# Patient Record
Sex: FEMALE | Race: BLACK OR AFRICAN AMERICAN | NOT HISPANIC OR LATINO | URBAN - METROPOLITAN AREA
[De-identification: names, ages, dates, MRNs, and addresses within clinical notes are randomized per-mention and may not be internally consistent; named-entity substitution may affect disease eponyms.]

---

## 2023-02-09 PROBLEM — M79.642 PAIN OF LEFT HAND: Status: ACTIVE | Noted: 2023-02-09

## 2023-02-09 PROBLEM — G51.0 LEFT-SIDED BELL'S PALSY: Status: ACTIVE | Noted: 2023-02-09

## 2023-02-09 PROBLEM — E05.90 SUBCLINICAL HYPERTHYROIDISM: Status: ACTIVE | Noted: 2023-02-09

## 2023-02-09 PROBLEM — Z99.3 USES POWERED WHEELCHAIR: Status: ACTIVE | Noted: 2023-02-09

## 2023-02-09 PROBLEM — R06.83 LOUD SNORING: Status: ACTIVE | Noted: 2023-02-09

## 2023-02-09 PROBLEM — R53.1 GENERAL WEAKNESS: Status: ACTIVE | Noted: 2023-02-09

## 2023-02-09 PROBLEM — G89.29 CHRONIC LOW BACK PAIN: Status: ACTIVE | Noted: 2023-02-09

## 2023-02-09 PROBLEM — R63.5 WEIGHT GAIN: Status: ACTIVE | Noted: 2023-02-09

## 2023-02-09 PROBLEM — E34.30 SHORT STATURE DUE TO ENDOCRINE DISORDER: Status: ACTIVE | Noted: 2023-02-09

## 2023-02-09 PROBLEM — R01.1 HEART MURMUR, SYSTOLIC: Status: ACTIVE | Noted: 2023-02-09

## 2023-02-09 PROBLEM — M48.061 LUMBAR SPINAL STENOSIS: Status: ACTIVE | Noted: 2023-02-09

## 2023-02-09 PROBLEM — I10 BENIGN ESSENTIAL HYPERTENSION: Status: ACTIVE | Noted: 2023-02-09

## 2023-02-09 PROBLEM — J01.90 ACUTE SINUSITIS: Status: ACTIVE | Noted: 2023-02-09

## 2023-02-09 PROBLEM — R05.9 COUGH: Status: ACTIVE | Noted: 2023-02-09

## 2023-02-09 PROBLEM — G47.19 EXCESSIVE DAYTIME SLEEPINESS: Status: ACTIVE | Noted: 2023-02-09

## 2023-02-09 PROBLEM — R09.82 POST-NASAL DRIP: Status: ACTIVE | Noted: 2023-02-09

## 2023-02-09 PROBLEM — E78.5 HYPERLIPIDEMIA: Status: ACTIVE | Noted: 2023-02-09

## 2023-02-09 PROBLEM — M25.473 ANKLE SWELLING: Status: ACTIVE | Noted: 2023-02-09

## 2023-02-09 PROBLEM — M25.532 ARTHRALGIA OF LEFT WRIST: Status: ACTIVE | Noted: 2023-02-09

## 2023-02-09 PROBLEM — M54.50 CHRONIC LOW BACK PAIN: Status: ACTIVE | Noted: 2023-02-09

## 2023-02-09 PROBLEM — R42 VERTIGO: Status: ACTIVE | Noted: 2023-02-09

## 2023-02-09 PROBLEM — E34.328 DWARFISM: Status: ACTIVE | Noted: 2023-02-09

## 2023-02-09 PROBLEM — L30.4 INTERTRIGO: Status: ACTIVE | Noted: 2023-02-09

## 2023-02-09 PROBLEM — D22.9 MULTIPLE NEVI: Status: ACTIVE | Noted: 2023-02-09

## 2023-02-09 RX ORDER — TRIAMTERENE/HYDROCHLOROTHIAZID 37.5-25 MG
1 TABLET ORAL DAILY
COMMUNITY
Start: 2018-03-30 | End: 2023-06-06 | Stop reason: SDUPTHER

## 2023-02-09 RX ORDER — LOSARTAN POTASSIUM 50 MG/1
1 TABLET ORAL DAILY
COMMUNITY
Start: 2018-03-30 | End: 2023-06-06 | Stop reason: SDUPTHER

## 2023-02-09 RX ORDER — ROSUVASTATIN CALCIUM 5 MG/1
1 TABLET, COATED ORAL NIGHTLY
COMMUNITY
Start: 2021-03-25 | End: 2023-06-06 | Stop reason: SDUPTHER

## 2023-03-28 ENCOUNTER — TELEPHONE (OUTPATIENT)
Dept: PRIMARY CARE | Facility: CLINIC | Age: 68
End: 2023-03-28
Payer: COMMERCIAL

## 2023-03-28 DIAGNOSIS — G47.33 OBSTRUCTIVE SLEEP APNEA: Primary | ICD-10-CM

## 2023-04-03 NOTE — TELEPHONE ENCOUNTER
Sleep apnea testing results faxed to the office.  Demonstrates moderate obstructive sleep apnea exacerbated to severe in rem off supine sleep.  They are recommending an in lab CPAP titration study.  In addition to other factors such as continued weight loss as well as consistent sleep and wake times.

## 2023-06-06 ENCOUNTER — OFFICE VISIT (OUTPATIENT)
Dept: PRIMARY CARE | Facility: CLINIC | Age: 68
End: 2023-06-06
Payer: MEDICARE

## 2023-06-06 ENCOUNTER — TELEPHONE (OUTPATIENT)
Dept: PRIMARY CARE | Facility: CLINIC | Age: 68
End: 2023-06-06

## 2023-06-06 VITALS — TEMPERATURE: 97.5 F | DIASTOLIC BLOOD PRESSURE: 88 MMHG | SYSTOLIC BLOOD PRESSURE: 136 MMHG | HEART RATE: 61 BPM

## 2023-06-06 DIAGNOSIS — E78.2 MIXED HYPERLIPIDEMIA: ICD-10-CM

## 2023-06-06 DIAGNOSIS — Z78.0 ASYMPTOMATIC MENOPAUSAL STATE: ICD-10-CM

## 2023-06-06 DIAGNOSIS — Z00.00 HEALTHCARE MAINTENANCE: ICD-10-CM

## 2023-06-06 DIAGNOSIS — M25.473 ANKLE SWELLING, UNSPECIFIED LATERALITY: ICD-10-CM

## 2023-06-06 DIAGNOSIS — M54.41 CHRONIC BILATERAL LOW BACK PAIN WITH BILATERAL SCIATICA: ICD-10-CM

## 2023-06-06 DIAGNOSIS — Z12.31 ENCOUNTER FOR SCREENING MAMMOGRAM FOR BREAST CANCER: ICD-10-CM

## 2023-06-06 DIAGNOSIS — Z71.89 CARDIAC RISK COUNSELING: ICD-10-CM

## 2023-06-06 DIAGNOSIS — Z12.12 SCREENING FOR COLORECTAL CANCER: ICD-10-CM

## 2023-06-06 DIAGNOSIS — Z99.3 USES POWERED WHEELCHAIR: ICD-10-CM

## 2023-06-06 DIAGNOSIS — E34.30 SHORT STATURE DUE TO ENDOCRINE DISORDER: ICD-10-CM

## 2023-06-06 DIAGNOSIS — E66.01 OBESITY, MORBID (MULTI): ICD-10-CM

## 2023-06-06 DIAGNOSIS — M48.061 SPINAL STENOSIS OF LUMBAR REGION, UNSPECIFIED WHETHER NEUROGENIC CLAUDICATION PRESENT: ICD-10-CM

## 2023-06-06 DIAGNOSIS — Z00.00 ROUTINE GENERAL MEDICAL EXAMINATION AT HEALTH CARE FACILITY: ICD-10-CM

## 2023-06-06 DIAGNOSIS — I10 BENIGN ESSENTIAL HYPERTENSION: ICD-10-CM

## 2023-06-06 DIAGNOSIS — G89.29 CHRONIC BILATERAL LOW BACK PAIN WITH BILATERAL SCIATICA: ICD-10-CM

## 2023-06-06 DIAGNOSIS — E05.90 SUBCLINICAL HYPERTHYROIDISM: ICD-10-CM

## 2023-06-06 DIAGNOSIS — Z12.11 SCREENING FOR COLORECTAL CANCER: ICD-10-CM

## 2023-06-06 DIAGNOSIS — Z71.89 ADVANCE DIRECTIVE DISCUSSED WITH PATIENT: ICD-10-CM

## 2023-06-06 DIAGNOSIS — Z00.00 MEDICARE ANNUAL WELLNESS VISIT, SUBSEQUENT: Primary | ICD-10-CM

## 2023-06-06 DIAGNOSIS — M54.42 CHRONIC BILATERAL LOW BACK PAIN WITH BILATERAL SCIATICA: ICD-10-CM

## 2023-06-06 DIAGNOSIS — Z13.89 ENCOUNTER FOR SCREENING FOR OTHER DISORDER: ICD-10-CM

## 2023-06-06 PROCEDURE — 1170F FXNL STATUS ASSESSED: CPT | Performed by: STUDENT IN AN ORGANIZED HEALTH CARE EDUCATION/TRAINING PROGRAM

## 2023-06-06 PROCEDURE — 1158F ADVNC CARE PLAN TLK DOCD: CPT | Performed by: STUDENT IN AN ORGANIZED HEALTH CARE EDUCATION/TRAINING PROGRAM

## 2023-06-06 PROCEDURE — 1036F TOBACCO NON-USER: CPT | Performed by: STUDENT IN AN ORGANIZED HEALTH CARE EDUCATION/TRAINING PROGRAM

## 2023-06-06 PROCEDURE — G0439 PPPS, SUBSEQ VISIT: HCPCS | Performed by: STUDENT IN AN ORGANIZED HEALTH CARE EDUCATION/TRAINING PROGRAM

## 2023-06-06 PROCEDURE — 3079F DIAST BP 80-89 MM HG: CPT | Performed by: STUDENT IN AN ORGANIZED HEALTH CARE EDUCATION/TRAINING PROGRAM

## 2023-06-06 PROCEDURE — 1159F MED LIST DOCD IN RCRD: CPT | Performed by: STUDENT IN AN ORGANIZED HEALTH CARE EDUCATION/TRAINING PROGRAM

## 2023-06-06 PROCEDURE — 1160F RVW MEDS BY RX/DR IN RCRD: CPT | Performed by: STUDENT IN AN ORGANIZED HEALTH CARE EDUCATION/TRAINING PROGRAM

## 2023-06-06 PROCEDURE — 99214 OFFICE O/P EST MOD 30 MIN: CPT | Performed by: STUDENT IN AN ORGANIZED HEALTH CARE EDUCATION/TRAINING PROGRAM

## 2023-06-06 PROCEDURE — 3075F SYST BP GE 130 - 139MM HG: CPT | Performed by: STUDENT IN AN ORGANIZED HEALTH CARE EDUCATION/TRAINING PROGRAM

## 2023-06-06 RX ORDER — LOSARTAN POTASSIUM 50 MG/1
50 TABLET ORAL DAILY
Qty: 90 TABLET | Refills: 1 | Status: SHIPPED | OUTPATIENT
Start: 2023-06-06 | End: 2024-01-04 | Stop reason: SDUPTHER

## 2023-06-06 RX ORDER — ROSUVASTATIN CALCIUM 5 MG/1
5 TABLET, COATED ORAL NIGHTLY
Qty: 90 TABLET | Refills: 1 | Status: SHIPPED | OUTPATIENT
Start: 2023-06-06 | End: 2024-02-16 | Stop reason: SDUPTHER

## 2023-06-06 RX ORDER — TRIAMTERENE/HYDROCHLOROTHIAZID 37.5-25 MG
1 TABLET ORAL DAILY
Qty: 90 TABLET | Refills: 1 | Status: SHIPPED | OUTPATIENT
Start: 2023-06-06 | End: 2024-05-07 | Stop reason: SDUPTHER

## 2023-06-06 ASSESSMENT — ENCOUNTER SYMPTOMS
NAUSEA: 0
PALPITATIONS: 0
COUGH: 0
VOMITING: 0
NERVOUS/ANXIOUS: 0
DEPRESSION: 0
DIZZINESS: 0
DIARRHEA: 0
NUMBNESS: 0
ABDOMINAL PAIN: 0
COLOR CHANGE: 0
LOSS OF SENSATION IN FEET: 0
MYALGIAS: 0
FEVER: 0
RHINORRHEA: 0
OCCASIONAL FEELINGS OF UNSTEADINESS: 1
CONSTIPATION: 0
SHORTNESS OF BREATH: 0
FREQUENCY: 0
DYSURIA: 0
DYSPHORIC MOOD: 0
ARTHRALGIAS: 0
FATIGUE: 0
LIGHT-HEADEDNESS: 0
CHILLS: 0

## 2023-06-06 ASSESSMENT — PATIENT HEALTH QUESTIONNAIRE - PHQ9
SUM OF ALL RESPONSES TO PHQ9 QUESTIONS 1 AND 2: 0
1. LITTLE INTEREST OR PLEASURE IN DOING THINGS: NOT AT ALL
2. FEELING DOWN, DEPRESSED OR HOPELESS: NOT AT ALL

## 2023-06-06 ASSESSMENT — ACTIVITIES OF DAILY LIVING (ADL)
TAKING_MEDICATION: INDEPENDENT
BATHING: INDEPENDENT
GROCERY_SHOPPING: NEEDS ASSISTANCE
MANAGING_FINANCES: INDEPENDENT
DRESSING: INDEPENDENT
DOING_HOUSEWORK: NEEDS ASSISTANCE

## 2023-06-06 NOTE — PATIENT INSTRUCTIONS
We have ordered a mammogram, DEXA scan and Cologuard for you.  If you do not hear back from scheduling within a week please give us a call    Please check your blood pressure at home and let us know what it is running as it seems to be running higher than normal here in the office.    I have ordered blood work for you to get done.  He will need to fast for between 10 to 12 hours.  You can get that done downstairs.    [facility] and get the wrist the results of your sleep study as it does not appear that we have any access to those.  Once we have those results, we can look at getting you the equipment for CPAP machine.

## 2023-06-06 NOTE — ACP (ADVANCE CARE PLANNING)
Advance Care Planning Note     Discussion Date: 06/06/23   Discussion Participants: patient    The patient wishes to discuss Advance Care Planning today and the following is a brief summary of our discussion.     Patient has capacity to make their own medical decisions: Yes  Health Care Agent/Surrogate Decision Maker documented in chart: Yes    Documents on file and valid:  Advance Directive/Living Will: No   Health Care Power of : No  Other: Her daughter    Communication of Medical Status/Prognosis:   Fair     Communication of Treatment Goals/Options:   Quality of life     Treatment Decisions      Time Statement: Total face to face time spent on advance care planning was 16 minutes with 10 minutes spent in counseling, including the explanation.    Riccardo Vela DO  6/6/2023 8:49 AM

## 2023-06-29 ENCOUNTER — APPOINTMENT (OUTPATIENT)
Dept: PRIMARY CARE | Facility: CLINIC | Age: 68
End: 2023-06-29
Payer: MEDICARE

## 2023-07-18 ENCOUNTER — APPOINTMENT (OUTPATIENT)
Dept: PRIMARY CARE | Facility: CLINIC | Age: 68
End: 2023-07-18
Payer: COMMERCIAL

## 2023-12-01 ENCOUNTER — TELEPHONE (OUTPATIENT)
Dept: PRIMARY CARE | Facility: CLINIC | Age: 68
End: 2023-12-01
Payer: COMMERCIAL

## 2023-12-01 DIAGNOSIS — R26.2 DIFFICULTY IN WALKING: Primary | ICD-10-CM

## 2023-12-01 DIAGNOSIS — M48.061 SPINAL STENOSIS OF LUMBAR REGION, UNSPECIFIED WHETHER NEUROGENIC CLAUDICATION PRESENT: ICD-10-CM

## 2023-12-01 NOTE — TELEPHONE ENCOUNTER
Pt needs a script for wheel chair  she is out of town and can't walk. Please follow up with her thank you

## 2024-01-04 ENCOUNTER — TELEPHONE (OUTPATIENT)
Dept: PRIMARY CARE | Facility: CLINIC | Age: 69
End: 2024-01-04
Payer: COMMERCIAL

## 2024-01-04 DIAGNOSIS — I10 BENIGN ESSENTIAL HYPERTENSION: ICD-10-CM

## 2024-01-04 RX ORDER — LOSARTAN POTASSIUM 50 MG/1
50 TABLET ORAL DAILY
Qty: 90 TABLET | Refills: 0 | Status: SHIPPED | OUTPATIENT
Start: 2024-01-04 | End: 2024-02-16 | Stop reason: SDUPTHER

## 2024-01-04 NOTE — TELEPHONE ENCOUNTER
Pt requesting refill. NOTE: she is out of [address]  [place], [place]  Phone # [phone]      Losartan  50 mg  1qd  #90  Refill: 1

## 2024-02-16 ENCOUNTER — OFFICE VISIT (OUTPATIENT)
Dept: PRIMARY CARE | Facility: CLINIC | Age: 69
End: 2024-02-16
Payer: MEDICARE

## 2024-02-16 VITALS
TEMPERATURE: 97.4 F | HEART RATE: 65 BPM | DIASTOLIC BLOOD PRESSURE: 84 MMHG | OXYGEN SATURATION: 97 % | SYSTOLIC BLOOD PRESSURE: 134 MMHG

## 2024-02-16 DIAGNOSIS — E34.30 SHORT STATURE DUE TO ENDOCRINE DISORDER: ICD-10-CM

## 2024-02-16 DIAGNOSIS — Z12.12 ENCOUNTER FOR COLORECTAL CANCER SCREENING: ICD-10-CM

## 2024-02-16 DIAGNOSIS — M54.41 CHRONIC BILATERAL LOW BACK PAIN WITH BILATERAL SCIATICA: ICD-10-CM

## 2024-02-16 DIAGNOSIS — Z99.3 USES POWERED WHEELCHAIR: ICD-10-CM

## 2024-02-16 DIAGNOSIS — G89.29 CHRONIC BILATERAL LOW BACK PAIN WITH BILATERAL SCIATICA: ICD-10-CM

## 2024-02-16 DIAGNOSIS — R73.01 ELEVATED FASTING GLUCOSE: ICD-10-CM

## 2024-02-16 DIAGNOSIS — E78.2 MIXED HYPERLIPIDEMIA: ICD-10-CM

## 2024-02-16 DIAGNOSIS — M54.42 CHRONIC BILATERAL LOW BACK PAIN WITH BILATERAL SCIATICA: ICD-10-CM

## 2024-02-16 DIAGNOSIS — M48.061 SPINAL STENOSIS OF LUMBAR REGION, UNSPECIFIED WHETHER NEUROGENIC CLAUDICATION PRESENT: Primary | ICD-10-CM

## 2024-02-16 DIAGNOSIS — R53.1 GENERAL WEAKNESS: ICD-10-CM

## 2024-02-16 DIAGNOSIS — I10 BENIGN ESSENTIAL HYPERTENSION: ICD-10-CM

## 2024-02-16 DIAGNOSIS — Z12.11 ENCOUNTER FOR COLORECTAL CANCER SCREENING: ICD-10-CM

## 2024-02-16 DIAGNOSIS — E55.9 VITAMIN D INSUFFICIENCY: ICD-10-CM

## 2024-02-16 DIAGNOSIS — E05.90 SUBCLINICAL HYPERTHYROIDISM: ICD-10-CM

## 2024-02-16 DIAGNOSIS — M25.473 ANKLE SWELLING, UNSPECIFIED LATERALITY: ICD-10-CM

## 2024-02-16 PROCEDURE — 1036F TOBACCO NON-USER: CPT | Performed by: STUDENT IN AN ORGANIZED HEALTH CARE EDUCATION/TRAINING PROGRAM

## 2024-02-16 PROCEDURE — 1160F RVW MEDS BY RX/DR IN RCRD: CPT | Performed by: STUDENT IN AN ORGANIZED HEALTH CARE EDUCATION/TRAINING PROGRAM

## 2024-02-16 PROCEDURE — 3079F DIAST BP 80-89 MM HG: CPT | Performed by: STUDENT IN AN ORGANIZED HEALTH CARE EDUCATION/TRAINING PROGRAM

## 2024-02-16 PROCEDURE — 3075F SYST BP GE 130 - 139MM HG: CPT | Performed by: STUDENT IN AN ORGANIZED HEALTH CARE EDUCATION/TRAINING PROGRAM

## 2024-02-16 PROCEDURE — 1159F MED LIST DOCD IN RCRD: CPT | Performed by: STUDENT IN AN ORGANIZED HEALTH CARE EDUCATION/TRAINING PROGRAM

## 2024-02-16 PROCEDURE — 99214 OFFICE O/P EST MOD 30 MIN: CPT | Performed by: STUDENT IN AN ORGANIZED HEALTH CARE EDUCATION/TRAINING PROGRAM

## 2024-02-16 RX ORDER — ROSUVASTATIN CALCIUM 5 MG/1
5 TABLET, COATED ORAL NIGHTLY
Qty: 30 TABLET | Refills: 5 | Status: SHIPPED | OUTPATIENT
Start: 2024-02-16

## 2024-02-16 RX ORDER — LOSARTAN POTASSIUM 50 MG/1
50 TABLET ORAL DAILY
Qty: 90 TABLET | Refills: 1 | Status: SHIPPED | OUTPATIENT
Start: 2024-02-16 | End: 2024-05-07 | Stop reason: SDUPTHER

## 2024-02-16 ASSESSMENT — ENCOUNTER SYMPTOMS
MYALGIAS: 0
NAUSEA: 0
FATIGUE: 0
VOMITING: 0
ARTHRALGIAS: 0
SHORTNESS OF BREATH: 0
COLOR CHANGE: 0
LOSS OF SENSATION IN FEET: 0
FEVER: 0
DIZZINESS: 0
DYSPHORIC MOOD: 0
COUGH: 0
ABDOMINAL PAIN: 0
NUMBNESS: 0
NERVOUS/ANXIOUS: 0
FREQUENCY: 0
OCCASIONAL FEELINGS OF UNSTEADINESS: 0
PALPITATIONS: 0
BACK PAIN: 1
LIGHT-HEADEDNESS: 0
CHILLS: 0
DIARRHEA: 0
NECK PAIN: 0
DEPRESSION: 0
NECK STIFFNESS: 0
CONSTIPATION: 0
RHINORRHEA: 0
DYSURIA: 0

## 2024-02-16 ASSESSMENT — PATIENT HEALTH QUESTIONNAIRE - PHQ9
2. FEELING DOWN, DEPRESSED OR HOPELESS: NOT AT ALL
10. IF YOU CHECKED OFF ANY PROBLEMS, HOW DIFFICULT HAVE THESE PROBLEMS MADE IT FOR YOU TO DO YOUR WORK, TAKE CARE OF THINGS AT HOME, OR GET ALONG WITH OTHER PEOPLE: NOT DIFFICULT AT ALL
1. LITTLE INTEREST OR PLEASURE IN DOING THINGS: NOT AT ALL
SUM OF ALL RESPONSES TO PHQ9 QUESTIONS 1 AND 2: 0

## 2024-02-16 ASSESSMENT — COLUMBIA-SUICIDE SEVERITY RATING SCALE - C-SSRS
1. IN THE PAST MONTH, HAVE YOU WISHED YOU WERE DEAD OR WISHED YOU COULD GO TO SLEEP AND NOT WAKE UP?: NO
6. HAVE YOU EVER DONE ANYTHING, STARTED TO DO ANYTHING, OR PREPARED TO DO ANYTHING TO END YOUR LIFE?: NO
2. HAVE YOU ACTUALLY HAD ANY THOUGHTS OF KILLING YOURSELF?: NO

## 2024-02-16 NOTE — PROGRESS NOTES
Subjective   Patient ID: Isa Arciniega is a 69 y.o. female who presents for face to face for wheel chair.    HPI     She is here for a face to face encounter for her powered wheelchair.  She has had her powered wheelchair for over 10 years.  She had a history and current severe back pain with lumbar spinal stenosis.  She had previously in a regular wheelchair and unable to use for daily activities.  She also had previous therapy for her back.  She is only to ambulate a short distance before needing to stop. This renders her nearly housebound without her wheelchair.      Review of Systems   Constitutional:  Negative for chills, fatigue and fever.   HENT:  Negative for congestion and rhinorrhea.    Eyes:  Negative for visual disturbance.   Respiratory:  Negative for cough and shortness of breath.    Cardiovascular:  Negative for chest pain and palpitations.   Gastrointestinal:  Negative for abdominal pain, constipation, diarrhea, nausea and vomiting.   Genitourinary:  Negative for dysuria and frequency.   Musculoskeletal:  Positive for back pain (lower back pain chronic) and gait problem (limited). Negative for arthralgias, myalgias, neck pain and neck stiffness.   Skin:  Negative for color change and rash.   Neurological:  Negative for dizziness, light-headedness and numbness.   Psychiatric/Behavioral:  Negative for dysphoric mood. The patient is not nervous/anxious.        Objective   /84   Pulse 65   Temp 36.3 °C (97.4 °F)   SpO2 97%     Physical Exam  Vitals and nursing note reviewed.   Constitutional:       General: She is not in acute distress.     Appearance: Normal appearance. She is obese. She is not ill-appearing or toxic-appearing.      Comments: Currently in power wheelchair   HENT:      Head: Normocephalic and atraumatic.   Cardiovascular:      Rate and Rhythm: Normal rate and regular rhythm.      Heart sounds: Normal heart sounds.   Pulmonary:      Effort: Pulmonary effort is normal.       Breath sounds: Normal breath sounds.   Abdominal:      General: Bowel sounds are normal.      Palpations: Abdomen is soft.   Neurological:      Mental Status: She is alert.       Assessment/Plan   Problem List Items Addressed This Visit             ICD-10-CM    Ankle swelling M25.473    Relevant Orders    Power mobility device    Benign essential hypertension I10    Relevant Medications    losartan (Cozaar) 50 mg tablet    Other Relevant Orders    CBC and Auto Differential    Comprehensive Metabolic Panel    Lipid Panel    Urinalysis with Reflex Microscopic    Thyroid Stimulating Hormone    Thyroxine, Free    Power mobility device    Chronic low back pain M54.50, G89.29    Relevant Orders    Referral to Physical Therapy    Power mobility device    General weakness R53.1    Relevant Orders    Power mobility device    Hyperlipidemia E78.5    Relevant Medications    rosuvastatin (Crestor) 5 mg tablet    Other Relevant Orders    CBC and Auto Differential    Comprehensive Metabolic Panel    Lipid Panel    Urinalysis with Reflex Microscopic    Lumbar spinal stenosis - Primary M48.061    Relevant Orders    Referral to Physical Therapy    Power mobility device    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    Relevant Orders    Power mobility device    Subclinical hyperthyroidism E05.90    Uses powered wheelchair Z99.3    Relevant Orders    Power mobility device     Other Visit Diagnoses         Codes    Encounter for colorectal cancer screening     Z12.11, Z12.12    Relevant Orders    Cologuard® colon cancer screening    Elevated fasting glucose     R73.01    Relevant Orders    Urinalysis with Reflex Microscopic    Hemoglobin A1C    Vitamin D insufficiency     E55.9    Relevant Orders    Vitamin D 25-Hydroxy,Total (for eval of Vitamin D levels)          History and physical examination as above.  Patient presenting for face-to-face encounter for continued use of her power wheelchair.  Patient has been in a power  wheelchair for over the past 10 years.  She has a history of severe back pain with lumbar spinal stenosis.  She has been evaluated by neurosurgery, however she does not want to move forward with any surgical procedures at least at this time unless things significantly worsen.  She has a history of the lumbar spinal stenosis with significant back pain in addition to ankle swelling as well as short stature due to endocrine disorder.  She had previously tried and failed a regular wheelchair but she was not able to use due to the physical limitations.  Refilled patient's additional medications.  Lab work orders placed to be done prior to patient's next appointment for Medicare wellness visit.

## 2024-02-16 NOTE — PATIENT INSTRUCTIONS
We will go ahead and send off the orders for the wheelchair tube Rogel so that they can get everything submitted.    I just sent in refills for your blood pressure medication and your rosuvastatin for the cholesterol to your pharmacy.  Please let me know if you have any issues picking this up.    I went ahead and put in lab work orders for you to have drawn prior to your next appointment with me for Medicare wellness visit.  We can set this up for June before you leave here today.  Please get the labs drawn at least a week ahead of time.  Please make sure you are fasting for the lab work but please stay well-hydrated.    I also sent an order for Cologuard testing.  You should get another kit in the mail and you can follow the directions to get that submitted.    Thank you

## 2024-05-07 ENCOUNTER — OFFICE VISIT (OUTPATIENT)
Dept: PRIMARY CARE | Facility: CLINIC | Age: 69
End: 2024-05-07
Payer: MEDICARE

## 2024-05-07 VITALS — DIASTOLIC BLOOD PRESSURE: 86 MMHG | HEART RATE: 59 BPM | TEMPERATURE: 97.9 F | SYSTOLIC BLOOD PRESSURE: 136 MMHG

## 2024-05-07 DIAGNOSIS — I10 BENIGN ESSENTIAL HYPERTENSION: Primary | ICD-10-CM

## 2024-05-07 PROCEDURE — 99213 OFFICE O/P EST LOW 20 MIN: CPT | Performed by: STUDENT IN AN ORGANIZED HEALTH CARE EDUCATION/TRAINING PROGRAM

## 2024-05-07 PROCEDURE — 1160F RVW MEDS BY RX/DR IN RCRD: CPT | Performed by: STUDENT IN AN ORGANIZED HEALTH CARE EDUCATION/TRAINING PROGRAM

## 2024-05-07 PROCEDURE — 3075F SYST BP GE 130 - 139MM HG: CPT | Performed by: STUDENT IN AN ORGANIZED HEALTH CARE EDUCATION/TRAINING PROGRAM

## 2024-05-07 PROCEDURE — 3079F DIAST BP 80-89 MM HG: CPT | Performed by: STUDENT IN AN ORGANIZED HEALTH CARE EDUCATION/TRAINING PROGRAM

## 2024-05-07 PROCEDURE — 1159F MED LIST DOCD IN RCRD: CPT | Performed by: STUDENT IN AN ORGANIZED HEALTH CARE EDUCATION/TRAINING PROGRAM

## 2024-05-07 RX ORDER — TRIAMTERENE/HYDROCHLOROTHIAZID 37.5-25 MG
1 TABLET ORAL DAILY
Qty: 90 TABLET | Refills: 1 | Status: SHIPPED | OUTPATIENT
Start: 2024-05-07

## 2024-05-07 RX ORDER — LOSARTAN POTASSIUM 50 MG/1
50 TABLET ORAL DAILY
Qty: 90 TABLET | Refills: 1 | Status: SHIPPED | OUTPATIENT
Start: 2024-05-07

## 2024-05-07 ASSESSMENT — ENCOUNTER SYMPTOMS
FEVER: 0
ABDOMINAL PAIN: 0
SHORTNESS OF BREATH: 0
CHILLS: 0
ARTHRALGIAS: 0
MYALGIAS: 0

## 2024-05-07 ASSESSMENT — PATIENT HEALTH QUESTIONNAIRE - PHQ9
2. FEELING DOWN, DEPRESSED OR HOPELESS: NOT AT ALL
SUM OF ALL RESPONSES TO PHQ9 QUESTIONS 1 AND 2: 0
1. LITTLE INTEREST OR PLEASURE IN DOING THINGS: NOT AT ALL

## 2024-05-07 NOTE — PROGRESS NOTES
Subjective   Patient ID: Isa Arciniega is a 69 y.o. female who presents for Needs paperwork filled out.    HPI     Patient coming in to have paperwork filled out.  She will be reading stories to [facility] and clearance form completed.  She had completed all of her childhood immunizations when she was younger.  She has no risk factors for tuberculosis.  She also needs refills of her medications for her high blood pressure.  She would like this sent into her pharmacy.    She was having some pain in her leg but this has since resolved    Review of Systems   Constitutional:  Negative for chills and fever.   Respiratory:  Negative for shortness of breath.    Cardiovascular:  Negative for chest pain.   Gastrointestinal:  Negative for abdominal pain.   Musculoskeletal:  Negative for arthralgias and myalgias.       Objective   /86   Pulse 59   Temp 36.6 °C (97.9 °F)     Physical Exam  Vitals and nursing note reviewed.   Constitutional:       General: She is not in acute distress.     Appearance: Normal appearance. She is obese. She is not ill-appearing or toxic-appearing.      Comments: In power wheelchair   Cardiovascular:      Rate and Rhythm: Normal rate and regular rhythm.      Heart sounds: Normal heart sounds.   Pulmonary:      Effort: Pulmonary effort is normal.      Breath sounds: Normal breath sounds.   Neurological:      Mental Status: She is alert.         Assessment/Plan   Problem List Items Addressed This Visit             ICD-10-CM    Benign essential hypertension - Primary I10    Relevant Medications    losartan (Cozaar) 50 mg tablet    triamterene-hydrochlorothiazid (Maxzide-25) 37.5-25 mg tablet

## 2024-05-15 ENCOUNTER — APPOINTMENT (OUTPATIENT)
Dept: PRIMARY CARE | Facility: CLINIC | Age: 69
End: 2024-05-15
Payer: MEDICARE

## 2024-07-25 ENCOUNTER — TELEPHONE (OUTPATIENT)
Dept: PRIMARY CARE | Facility: CLINIC | Age: 69
End: 2024-07-25
Payer: COMMERCIAL

## 2024-07-25 DIAGNOSIS — I10 BENIGN ESSENTIAL HYPERTENSION: ICD-10-CM

## 2024-07-25 NOTE — TELEPHONE ENCOUNTER
Patient is requesting refill on :      losartan (Cozaar) 50 mg tablet   Take 1 tablet (50 mg) by mouth once daily.   Quantity: 90    triamterene-hydrochlorothiazid (Maxzide-25) 37.5-25 mg tablet  Take 1 tablet by mouth once daily.   Quantity: 90     [id number]   Phone: [phone]  Fax: [phone]

## 2024-07-26 RX ORDER — TRIAMTERENE/HYDROCHLOROTHIAZID 37.5-25 MG
1 TABLET ORAL DAILY
Qty: 90 TABLET | Refills: 0 | Status: SHIPPED | OUTPATIENT
Start: 2024-07-26

## 2024-07-26 RX ORDER — LOSARTAN POTASSIUM 50 MG/1
50 TABLET ORAL DAILY
Qty: 90 TABLET | Refills: 0 | Status: SHIPPED | OUTPATIENT
Start: 2024-07-26

## 2024-09-24 ENCOUNTER — APPOINTMENT (OUTPATIENT)
Dept: PRIMARY CARE | Facility: CLINIC | Age: 69
End: 2024-09-24
Payer: COMMERCIAL

## 2024-09-24 VITALS
SYSTOLIC BLOOD PRESSURE: 142 MMHG | HEART RATE: 78 BPM | DIASTOLIC BLOOD PRESSURE: 70 MMHG | HEIGHT: 55 IN | WEIGHT: 160.2 LBS | OXYGEN SATURATION: 99 % | BODY MASS INDEX: 37.08 KG/M2

## 2024-09-24 DIAGNOSIS — G89.29 CHRONIC BILATERAL LOW BACK PAIN WITH BILATERAL SCIATICA: ICD-10-CM

## 2024-09-24 DIAGNOSIS — Z12.12 SCREENING FOR COLORECTAL CANCER: ICD-10-CM

## 2024-09-24 DIAGNOSIS — M51.369 DEGENERATIVE DISC DISEASE, LUMBAR: ICD-10-CM

## 2024-09-24 DIAGNOSIS — E78.2 MIXED HYPERLIPIDEMIA: ICD-10-CM

## 2024-09-24 DIAGNOSIS — Z00.00 MEDICARE ANNUAL WELLNESS VISIT, SUBSEQUENT: Primary | ICD-10-CM

## 2024-09-24 DIAGNOSIS — Z71.89 CARDIAC RISK COUNSELING: ICD-10-CM

## 2024-09-24 DIAGNOSIS — M48.061 SPINAL STENOSIS OF LUMBAR REGION, UNSPECIFIED WHETHER NEUROGENIC CLAUDICATION PRESENT: ICD-10-CM

## 2024-09-24 DIAGNOSIS — Z99.3 USES POWERED WHEELCHAIR: ICD-10-CM

## 2024-09-24 DIAGNOSIS — I10 BENIGN ESSENTIAL HYPERTENSION: ICD-10-CM

## 2024-09-24 DIAGNOSIS — G47.33 OSA (OBSTRUCTIVE SLEEP APNEA): ICD-10-CM

## 2024-09-24 DIAGNOSIS — E34.30 SHORT STATURE DUE TO ENDOCRINE DISORDER: ICD-10-CM

## 2024-09-24 DIAGNOSIS — R05.1 ACUTE COUGH: ICD-10-CM

## 2024-09-24 DIAGNOSIS — Z12.11 SCREENING FOR COLORECTAL CANCER: ICD-10-CM

## 2024-09-24 DIAGNOSIS — Z13.6 SCREENING FOR CARDIOVASCULAR CONDITION: ICD-10-CM

## 2024-09-24 DIAGNOSIS — Z00.00 ROUTINE GENERAL MEDICAL EXAMINATION AT HEALTH CARE FACILITY: ICD-10-CM

## 2024-09-24 DIAGNOSIS — Z78.0 ASYMPTOMATIC MENOPAUSAL STATE: ICD-10-CM

## 2024-09-24 DIAGNOSIS — H61.22 IMPACTED CERUMEN OF LEFT EAR: ICD-10-CM

## 2024-09-24 DIAGNOSIS — E05.90 SUBCLINICAL HYPERTHYROIDISM: ICD-10-CM

## 2024-09-24 DIAGNOSIS — Z71.89 ADVANCE DIRECTIVE DISCUSSED WITH PATIENT: ICD-10-CM

## 2024-09-24 DIAGNOSIS — G51.0 LEFT-SIDED BELL'S PALSY: ICD-10-CM

## 2024-09-24 DIAGNOSIS — M54.42 CHRONIC BILATERAL LOW BACK PAIN WITH BILATERAL SCIATICA: ICD-10-CM

## 2024-09-24 DIAGNOSIS — Z12.31 ENCOUNTER FOR SCREENING MAMMOGRAM FOR BREAST CANCER: ICD-10-CM

## 2024-09-24 DIAGNOSIS — M54.41 CHRONIC BILATERAL LOW BACK PAIN WITH BILATERAL SCIATICA: ICD-10-CM

## 2024-09-24 DIAGNOSIS — E66.01 OBESITY, MORBID (MULTI): ICD-10-CM

## 2024-09-24 PROBLEM — J01.90 ACUTE SINUSITIS: Status: RESOLVED | Noted: 2023-02-09 | Resolved: 2024-09-24

## 2024-09-24 PROBLEM — R63.5 WEIGHT GAIN: Status: RESOLVED | Noted: 2023-02-09 | Resolved: 2024-09-24

## 2024-09-24 PROBLEM — M54.2 NECK PAIN: Status: RESOLVED | Noted: 2023-02-09 | Resolved: 2024-09-24

## 2024-09-24 PROBLEM — R42 VERTIGO: Status: RESOLVED | Noted: 2023-02-09 | Resolved: 2024-09-24

## 2024-09-24 PROBLEM — L30.4 INTERTRIGO: Status: RESOLVED | Noted: 2023-02-09 | Resolved: 2024-09-24

## 2024-09-24 PROBLEM — R06.83 LOUD SNORING: Status: RESOLVED | Noted: 2023-02-09 | Resolved: 2024-09-24

## 2024-09-24 PROBLEM — R09.82 POST-NASAL DRIP: Status: RESOLVED | Noted: 2023-02-09 | Resolved: 2024-09-24

## 2024-09-24 PROBLEM — G47.8 NON-RESTORATIVE SLEEP: Status: RESOLVED | Noted: 2024-09-11 | Resolved: 2024-09-24

## 2024-09-24 PROBLEM — M79.2 NEUROGENIC PAIN: Status: RESOLVED | Noted: 2024-09-24 | Resolved: 2024-09-24

## 2024-09-24 PROBLEM — G47.19 EXCESSIVE DAYTIME SLEEPINESS: Status: RESOLVED | Noted: 2023-02-09 | Resolved: 2024-09-24

## 2024-09-24 PROBLEM — M25.473 ANKLE SWELLING: Status: RESOLVED | Noted: 2023-02-09 | Resolved: 2024-09-24

## 2024-09-24 PROBLEM — M25.532 ARTHRALGIA OF LEFT WRIST: Status: RESOLVED | Noted: 2023-02-09 | Resolved: 2024-09-24

## 2024-09-24 PROBLEM — M79.642 PAIN OF LEFT HAND: Status: RESOLVED | Noted: 2023-02-09 | Resolved: 2024-09-24

## 2024-09-24 PROCEDURE — 69209 REMOVE IMPACTED EAR WAX UNI: CPT | Performed by: STUDENT IN AN ORGANIZED HEALTH CARE EDUCATION/TRAINING PROGRAM

## 2024-09-24 PROCEDURE — 1159F MED LIST DOCD IN RCRD: CPT | Performed by: STUDENT IN AN ORGANIZED HEALTH CARE EDUCATION/TRAINING PROGRAM

## 2024-09-24 PROCEDURE — 3078F DIAST BP <80 MM HG: CPT | Performed by: STUDENT IN AN ORGANIZED HEALTH CARE EDUCATION/TRAINING PROGRAM

## 2024-09-24 PROCEDURE — 99497 ADVNCD CARE PLAN 30 MIN: CPT | Performed by: STUDENT IN AN ORGANIZED HEALTH CARE EDUCATION/TRAINING PROGRAM

## 2024-09-24 PROCEDURE — G0439 PPPS, SUBSEQ VISIT: HCPCS | Performed by: STUDENT IN AN ORGANIZED HEALTH CARE EDUCATION/TRAINING PROGRAM

## 2024-09-24 PROCEDURE — 99214 OFFICE O/P EST MOD 30 MIN: CPT | Performed by: STUDENT IN AN ORGANIZED HEALTH CARE EDUCATION/TRAINING PROGRAM

## 2024-09-24 PROCEDURE — 1170F FXNL STATUS ASSESSED: CPT | Performed by: STUDENT IN AN ORGANIZED HEALTH CARE EDUCATION/TRAINING PROGRAM

## 2024-09-24 PROCEDURE — 3008F BODY MASS INDEX DOCD: CPT | Performed by: STUDENT IN AN ORGANIZED HEALTH CARE EDUCATION/TRAINING PROGRAM

## 2024-09-24 PROCEDURE — 3077F SYST BP >= 140 MM HG: CPT | Performed by: STUDENT IN AN ORGANIZED HEALTH CARE EDUCATION/TRAINING PROGRAM

## 2024-09-24 PROCEDURE — 1160F RVW MEDS BY RX/DR IN RCRD: CPT | Performed by: STUDENT IN AN ORGANIZED HEALTH CARE EDUCATION/TRAINING PROGRAM

## 2024-09-24 PROCEDURE — 1158F ADVNC CARE PLAN TLK DOCD: CPT | Performed by: STUDENT IN AN ORGANIZED HEALTH CARE EDUCATION/TRAINING PROGRAM

## 2024-09-24 PROCEDURE — 1123F ACP DISCUSS/DSCN MKR DOCD: CPT | Performed by: STUDENT IN AN ORGANIZED HEALTH CARE EDUCATION/TRAINING PROGRAM

## 2024-09-24 RX ORDER — FLUTICASONE PROPIONATE 50 MCG
2 SPRAY, SUSPENSION (ML) NASAL
COMMUNITY
Start: 2022-12-23

## 2024-09-24 RX ORDER — PROMETHAZINE HYDROCHLORIDE AND CODEINE PHOSPHATE 6.25; 1 MG/5ML; MG/5ML
5 SOLUTION ORAL EVERY 8 HOURS PRN
Qty: 150 ML | Refills: 0 | Status: SHIPPED | OUTPATIENT
Start: 2024-09-24 | End: 2024-09-29

## 2024-09-24 ASSESSMENT — ENCOUNTER SYMPTOMS
MYALGIAS: 0
ARTHRALGIAS: 0
COUGH: 1
PALPITATIONS: 0
VOMITING: 0
DEPRESSION: 0
DYSURIA: 0
NAUSEA: 0
LIGHT-HEADEDNESS: 0
CHILLS: 0
DIARRHEA: 0
SHORTNESS OF BREATH: 0
CONSTIPATION: 0
FEVER: 0
ABDOMINAL PAIN: 0
DYSPHORIC MOOD: 0
DIZZINESS: 0
COLOR CHANGE: 0
NERVOUS/ANXIOUS: 0
NUMBNESS: 0
FATIGUE: 0
LOSS OF SENSATION IN FEET: 0
OCCASIONAL FEELINGS OF UNSTEADINESS: 1
RHINORRHEA: 0
FREQUENCY: 0

## 2024-09-24 ASSESSMENT — PATIENT HEALTH QUESTIONNAIRE - PHQ9
10. IF YOU CHECKED OFF ANY PROBLEMS, HOW DIFFICULT HAVE THESE PROBLEMS MADE IT FOR YOU TO DO YOUR WORK, TAKE CARE OF THINGS AT HOME, OR GET ALONG WITH OTHER PEOPLE: NOT DIFFICULT AT ALL
2. FEELING DOWN, DEPRESSED OR HOPELESS: NOT AT ALL
1. LITTLE INTEREST OR PLEASURE IN DOING THINGS: NOT AT ALL
SUM OF ALL RESPONSES TO PHQ9 QUESTIONS 1 AND 2: 0

## 2024-09-24 ASSESSMENT — ACTIVITIES OF DAILY LIVING (ADL)
BATHING: INDEPENDENT
GROCERY_SHOPPING: INDEPENDENT
DOING_HOUSEWORK: INDEPENDENT
TAKING_MEDICATION: INDEPENDENT
DRESSING: INDEPENDENT
MANAGING_FINANCES: INDEPENDENT

## 2024-09-24 NOTE — ASSESSMENT & PLAN NOTE
Orders:    XR DEXA bone density; Future    
  Orders:    promethazine-codeine (Phenergan W/Codeine) 6.25-10 mg/5 mL syrup; Take 5 mL by mouth every 8 hours if needed for cough for up to 5 days.    
2015. Still some residual weakness on the left side of the face. No changes or other concerns.         
Agreeable to Cologuard.    Orders:    Cologuard®; Future    Cologuard®    
Chronic. On rosuvastatin.         
Chronic. Stable. Controlled with losartan and triamterene-hydrochlorothiazide.         
Chronic. Stable. In power wheelchair.         
Chronic. Stable. Worse when she walks. No recent changes. Has not followed with orthopedics in the past.         
DME: [employer]  S/N: [id number], in [employer] for downloads  Pt setup: 10/18/2023    Chronic and Stable. Using CPAP regularly and has significant improvement in her sleep.         
Following thyroid labs.         
Pt declines completing HCP/done

## 2024-09-24 NOTE — PROGRESS NOTES
Subjective   Reason for Visit: Isa Arciniega is an 69 y.o. female here for a Medicare Wellness visit.     Past Medical, Surgical, and Family History reviewed and updated in chart.    Reviewed all medications by prescribing practitioner or clinical pharmacist (such as prescriptions, OTCs, herbal therapies and supplements) and documented in the medical record.    HPI    She has been having a cough for the past week.  COVID testing negative.    Dental: Has yearly visits with the dentist.  Vision: Glasses. Eye appointments regularly.     Last Colonoscopy: Done a long time ago. Agreeable to Cologuard testing. Did not get it done last year.  Last Pap smear: Followed with Ob/Gyn last PAP at age 67 years and was negative.  Last Mammogram: Recommended. One many years ago. Has not really wanted one since.  Last Dexa scan: Never done. Order placed.  AAA Screening: Not indicated  Low Dose Lung CT Screening: Not indicated.     Influenza: Defers. Recommended annually.  Tetanus: Unknown. Recommended  Pneumonia: Recommended  COVID: Recommended updated vaccine.  Shingles: Recommended.  RSV: Recommended.      Patient Care Team:  Riccardo Vela DO as PCP - General (Family Medicine)  Riccardo Vela DO as PCP - [facility] Attributed Provider     Review of Systems   Constitutional:  Negative for chills, fatigue and fever.   HENT:  Negative for congestion and rhinorrhea.    Eyes:  Negative for visual disturbance.   Respiratory:  Positive for cough. Negative for shortness of breath.    Cardiovascular:  Negative for chest pain and palpitations.   Gastrointestinal:  Negative for abdominal pain, constipation, diarrhea, nausea and vomiting.   Genitourinary:  Negative for dysuria and frequency.   Musculoskeletal:  Negative for arthralgias and myalgias.   Skin:  Negative for color change and rash.   Neurological:  Negative for dizziness, light-headedness and numbness.   Psychiatric/Behavioral:  Negative for dysphoric mood. The patient is  "not nervous/anxious.        Objective   Vitals:  /70 (BP Location: Left arm, Patient Position: Sitting)   Pulse 78   Ht 1.194 m (3' 11\")   Wt 72.7 kg (160 lb 3.2 oz)   SpO2 99%   BMI 50.99 kg/m²       Physical Exam  Vitals and nursing note reviewed.   Constitutional:       General: She is not in acute distress.     Appearance: Normal appearance. She is obese. She is not ill-appearing or toxic-appearing.      Comments: In power wheelchair   HENT:      Head: Normocephalic and atraumatic.      Right Ear: Tympanic membrane, ear canal and external ear normal.      Left Ear: Tympanic membrane, ear canal and external ear normal. There is impacted cerumen.      Nose: Nose normal.      Mouth/Throat:      Mouth: Mucous membranes are moist.   Eyes:      Extraocular Movements: Extraocular movements intact.      Conjunctiva/sclera: Conjunctivae normal.      Pupils: Pupils are equal, round, and reactive to light.   Cardiovascular:      Rate and Rhythm: Normal rate and regular rhythm.      Pulses: Normal pulses.      Heart sounds: Normal heart sounds.   Pulmonary:      Effort: Pulmonary effort is normal.      Breath sounds: Normal breath sounds.   Abdominal:      General: Bowel sounds are normal.      Palpations: Abdomen is soft.   Musculoskeletal:         General: Normal range of motion.      Cervical back: Normal range of motion and neck supple.   Skin:     General: Skin is warm.   Neurological:      General: No focal deficit present.      Mental Status: She is alert and oriented to person, place, and time. Mental status is at baseline.      Cranial Nerves: No cranial nerve deficit.      Sensory: No sensory deficit.   Psychiatric:         Mood and Affect: Mood normal.         Behavior: Behavior normal.         Thought Content: Thought content normal.         Judgment: Judgment normal.         The ASCVD Risk score (Rafael DK, et al., 2019) failed to calculate for the following reasons:    Cannot find a previous HDL " lab    Cannot find a previous total cholesterol lab  Time spent was 15 mins reviewing    Patient ID: Isa Arciniega is a 69 y.o. female.    Ear Cerumen Removal    Date/Time: 9/24/2024 8:35 AM    Performed by: Riccardo Vela DO  Authorized by: Riccardo Vela DO    Consent:     Consent obtained:  Verbal    Consent given by:  Patient    Risks, benefits, and alternatives were discussed: yes      Risks discussed:  Incomplete removal, infection and dizziness    Alternatives discussed:  No treatment  Universal protocol:     Patient identity confirmed:  Verbally with patient  Procedure details:     Location:  L ear    Procedure type: irrigation      Procedure outcomes: cerumen removed    Post-procedure details:     Inspection:  Ear canal clear    Hearing quality:  Improved    Procedure completion:  Tolerated well, no immediate complications      Assessment & Plan  Medicare annual wellness visit, subsequent         Advance directive discussed with patient         Cardiac risk counseling         Acute cough    Orders:    promethazine-codeine (Phenergan W/Codeine) 6.25-10 mg/5 mL syrup; Take 5 mL by mouth every 8 hours if needed for cough for up to 5 days.    Benign essential hypertension  Chronic. Stable. Controlled with losartan and triamterene-hydrochlorothiazide.         Mixed hyperlipidemia  Chronic. On rosuvastatin.         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         Obesity, morbid (Multi)  Chronic. Stable. In power wheelchair.         Screening for colorectal cancer  Agreeable to Cologuard.    Orders:    Cologuard®; Future    Cologuard®    Left-sided Bell's palsy  2015. Still some residual weakness on the left side of the face. No changes or other concerns.         Spinal stenosis of lumbar region, unspecified whether neurogenic claudication present  Chronic. Stable. Worse when she walks. No recent changes. Has not followed with orthopedics in the  past.         Chronic bilateral low back pain with bilateral sciatica  Chronic. Stable. Worse when she walks. No recent changes. Has not followed with orthopedics in the past.         Uses powered wheelchair         Degenerative disc disease, lumbar  Chronic. Stable. Worse when she walks. No recent changes. Has not followed with orthopedics in the past.         TONIO (obstructive sleep apnea)  DME: [employer]  S/N: [id number], in [employer] for downloads  Pt setup: 10/18/2023    Chronic and Stable. Using CPAP regularly and has significant improvement in her sleep.         Routine general medical examination at health care facility    Orders:    1 Year Follow Up In [facility] - PCP - Wellness Exam; Future    Screening for cardiovascular condition    Orders:    CT cardiac scoring wo IV contrast; Future    Asymptomatic menopausal state    Orders:    XR DEXA bone density; Future    Encounter for screening mammogram for breast cancer    Orders:    BI mammo bilateral screening tomosynthesis; Future    Impacted cerumen of left ear                Advance Directives Discussion  16 - 20 minutes were spent discussing Advanced Care Planning (including a Living Will, Medical Power Of , as well as specific end of life choices and/or directives). The details of that discussion were documented in Advanced Directives Discussion section of the medical record.

## 2024-09-24 NOTE — PATIENT INSTRUCTIONS
Thank you for coming in for your Medicare annual wellness examination.    With your cough, I went ahead and sent in some cough medication for you.  Sounds that you had this in the past before.  Please call us back if they do not.  At the pharmacy.  We can try to send it to a different pharmacy if that is the case.  Symptoms are going on a week, I also went ahead and sent in for a Z-Oziel should your symptoms fail to improve over the next weekend here.    We also ordered some other follow-up testing.    You can go right downstairs and get your lab work done today.  We will contact you back with results.  There is a urine test, but if you cannot do that today, I am not can worry about that when too much.    I did order for Cologuard testing as we had done last year.  Please stop by before you leave today at the  to update your address to make sure that it is sent to the right place.  If you do not receive the kit in the mail the next 1 to 2 weeks, please call our office back so we can try to reach out to the company to get them sent to your current address.  Please follow the directions on the bottle for submission.    We also ordered some additional testing including a mammogram screening as well as a bone density/DEXA scan testing.  These can be scheduled over your MyChart.  You should also be receiving a call in the next week or 2 to get scheduled for this as well.  Please call us back if you have any issues getting scheduled or if you do not hear from them.    Also putting an order for CT cardiac score.  You should also be able to schedule this over your [employer].  If you do not receive a call to get scheduled in the next 1 to 2 weeks, please call our office back so we can assist you with getting scheduled for this as well.  We will follow-up on the results once these are scheduled.  For some of the scans, especially the CT cardiac score, they are scheduling out 1 or 2 months.    We did discuss  recommendations for immunization.  I do recommend yearly flu shot as well as getting an updated COVID immunization.  We did discuss recommendations also for the shingles vaccines as well as pneumonia, tetanus as well as RSV vaccines.  You can also receive these at your local pharmacy.  Please call us if you have any other questions and let us know if you do receive any of these immunizations.    Please call for any medication refills.    We will plan on a follow-up in the next 6 months.  Please call sooner with any other acute concerns or complaints.    Thank you

## 2024-09-24 NOTE — ACP (ADVANCE CARE PLANNING)
Confirming Previous Code Status:   Advance Care Planning Note     Discussion Date: 09/24/24   Discussion Participants: patient    The patient wishes to discuss Advance Care Planning today and the following is a brief summary of our discussion.     Patient has capacity to make their own medical decisions: Yes  Health Care Agent/Surrogate Decision Maker documented in chart: Yes    Documents on file and valid:  Advance Directive/Living Will: No   Health Care Power of : No  Other: Daughter: Camryn Wiggins    Communication of Medical Status/Prognosis:   Fair     Communication of Treatment Goals/Options:   Quality of Life     Treatment Decisions  Goals of Care: survival is prioritized, if goals for quality or survival can reasonably be achieved     Follow Up Plan  Yearly and as needed    Team Members  Patient, Daughter    Time Statement: Total face to face time spent on advance care planning was 16 minutes with 16 minutes spent in counseling, including the explanation.    Riccardo Vela DO  9/24/2024 8:33 AM

## 2025-01-13 ENCOUNTER — TELEPHONE (OUTPATIENT)
Dept: PRIMARY CARE | Facility: CLINIC | Age: 70
End: 2025-01-13
Payer: COMMERCIAL

## 2025-01-13 DIAGNOSIS — M48.061 SPINAL STENOSIS OF LUMBAR REGION, UNSPECIFIED WHETHER NEUROGENIC CLAUDICATION PRESENT: ICD-10-CM

## 2025-01-13 DIAGNOSIS — Z99.3 USES POWERED WHEELCHAIR: ICD-10-CM

## 2025-01-13 DIAGNOSIS — E34.30 SHORT STATURE DUE TO ENDOCRINE DISORDER: Primary | ICD-10-CM

## 2025-01-13 NOTE — TELEPHONE ENCOUNTER
Pt requesting script for handicap placard.  Pt requesting it be mailed to home address.    Isa # [phone]

## 2025-01-23 ENCOUNTER — OFFICE VISIT (OUTPATIENT)
Dept: PRIMARY CARE | Facility: CLINIC | Age: 70
End: 2025-01-23
Payer: MEDICARE

## 2025-01-23 VITALS
TEMPERATURE: 97.6 F | SYSTOLIC BLOOD PRESSURE: 146 MMHG | DIASTOLIC BLOOD PRESSURE: 88 MMHG | OXYGEN SATURATION: 94 % | HEART RATE: 73 BPM

## 2025-01-23 DIAGNOSIS — J20.9 ACUTE BRONCHITIS, UNSPECIFIED ORGANISM: Primary | ICD-10-CM

## 2025-01-23 PROCEDURE — 3077F SYST BP >= 140 MM HG: CPT | Performed by: STUDENT IN AN ORGANIZED HEALTH CARE EDUCATION/TRAINING PROGRAM

## 2025-01-23 PROCEDURE — 1160F RVW MEDS BY RX/DR IN RCRD: CPT | Performed by: STUDENT IN AN ORGANIZED HEALTH CARE EDUCATION/TRAINING PROGRAM

## 2025-01-23 PROCEDURE — 1123F ACP DISCUSS/DSCN MKR DOCD: CPT | Performed by: STUDENT IN AN ORGANIZED HEALTH CARE EDUCATION/TRAINING PROGRAM

## 2025-01-23 PROCEDURE — 3079F DIAST BP 80-89 MM HG: CPT | Performed by: STUDENT IN AN ORGANIZED HEALTH CARE EDUCATION/TRAINING PROGRAM

## 2025-01-23 PROCEDURE — 99213 OFFICE O/P EST LOW 20 MIN: CPT | Performed by: STUDENT IN AN ORGANIZED HEALTH CARE EDUCATION/TRAINING PROGRAM

## 2025-01-23 PROCEDURE — 1159F MED LIST DOCD IN RCRD: CPT | Performed by: STUDENT IN AN ORGANIZED HEALTH CARE EDUCATION/TRAINING PROGRAM

## 2025-01-23 PROCEDURE — 1036F TOBACCO NON-USER: CPT | Performed by: STUDENT IN AN ORGANIZED HEALTH CARE EDUCATION/TRAINING PROGRAM

## 2025-01-23 RX ORDER — AMOXICILLIN AND CLAVULANATE POTASSIUM 875; 125 MG/1; MG/1
875 TABLET, FILM COATED ORAL 2 TIMES DAILY
Qty: 10 TABLET | Refills: 0 | Status: SHIPPED | OUTPATIENT
Start: 2025-01-23 | End: 2025-01-28

## 2025-01-23 ASSESSMENT — ENCOUNTER SYMPTOMS
ABDOMINAL PAIN: 0
FATIGUE: 0
SINUS PRESSURE: 0
CONSTIPATION: 0
COUGH: 1
SINUS PAIN: 0
CHILLS: 0
VOMITING: 0
DIARRHEA: 0
NAUSEA: 0
RHINORRHEA: 0

## 2025-01-23 NOTE — PROGRESS NOTES
Subjective   Patient ID: Isa Arciniega is a 70 y.o. female who presents for Cough (X Saturday/COVID neg on Monday ).    Cough  Pertinent negatives include no chest pain, chills, postnasal drip or rhinorrhea.        She started with a cough about 5 days ago.  The cough is getting worse and she feels like it is coming from the lungs.  COVID testing negative.  Other family members had similar symptoms prior.  She did recently get back from a trip from [place] when most of her symptoms started  She has used some of the medication she has had at home to help with her symptoms but this was not helping.  She did have some leftover antibiotic that she started taking 2 days ago that has helped her symptoms.    Her blood pressures at home in the 120s/80s.    Review of Systems   Constitutional:  Negative for chills and fatigue.   HENT:  Positive for congestion. Negative for postnasal drip, rhinorrhea, sinus pressure and sinus pain.    Respiratory:  Positive for cough.    Cardiovascular:  Negative for chest pain.   Gastrointestinal:  Negative for abdominal pain, constipation, diarrhea, nausea and vomiting.       Objective   /88   Pulse 73   Temp 36.4 °C (97.6 °F)   SpO2 94%     Physical Exam  Vitals and nursing note reviewed.   Constitutional:       General: She is not in acute distress.     Appearance: Normal appearance. She is obese. She is not ill-appearing or toxic-appearing.   HENT:      Head: Normocephalic and atraumatic.      Nose: Congestion present.      Mouth/Throat:      Mouth: Mucous membranes are moist.   Cardiovascular:      Rate and Rhythm: Normal rate and regular rhythm.      Heart sounds: Normal heart sounds.   Pulmonary:      Effort: Pulmonary effort is normal. No respiratory distress.      Breath sounds: Rhonchi present. No wheezing.   Neurological:      Mental Status: She is alert.         Assessment/Plan   Problem List Items Addressed This Visit    None  Visit Diagnoses         Codes    Acute  bronchitis, unspecified organism    -  Primary J20.9    Relevant Medications    amoxicillin-pot clavulanate (Augmentin) 875-125 mg tablet    Other Relevant Orders    XR chest 2 views          History and physical examination as above.  Patient with ongoing cough for the past 5 days with other exam findings as above.  She started taking an antibiotic at home with amoxicillin which states has been helping with her symptoms.  Continued on a course of Augmentin for the next 5 days.  May be able to extend the similar 2 days depending on her symptom progression.  Chest x-ray ordered for the patient as well.  Discussed signs and symptoms that would require reevaluation in the office versus immediate treatment in the emergency department.  She is understanding and in agreement this plan.

## 2025-01-27 ENCOUNTER — HOSPITAL ENCOUNTER (OUTPATIENT)
Dept: RADIOLOGY | Facility: CLINIC | Age: 70
Discharge: HOME | End: 2025-01-27
Payer: MEDICARE

## 2025-01-27 ENCOUNTER — OFFICE VISIT (OUTPATIENT)
Dept: PRIMARY CARE | Facility: CLINIC | Age: 70
End: 2025-01-27
Payer: MEDICARE

## 2025-01-27 ENCOUNTER — TELEPHONE (OUTPATIENT)
Dept: PRIMARY CARE | Facility: CLINIC | Age: 70
End: 2025-01-27
Payer: COMMERCIAL

## 2025-01-27 VITALS
DIASTOLIC BLOOD PRESSURE: 80 MMHG | HEART RATE: 78 BPM | SYSTOLIC BLOOD PRESSURE: 142 MMHG | TEMPERATURE: 97.9 F | OXYGEN SATURATION: 93 %

## 2025-01-27 DIAGNOSIS — H61.22 LEFT EAR IMPACTED CERUMEN: Primary | ICD-10-CM

## 2025-01-27 DIAGNOSIS — R05.1 ACUTE COUGH: ICD-10-CM

## 2025-01-27 DIAGNOSIS — J20.9 ACUTE BRONCHITIS, UNSPECIFIED ORGANISM: ICD-10-CM

## 2025-01-27 PROCEDURE — 71046 X-RAY EXAM CHEST 2 VIEWS: CPT

## 2025-01-27 PROCEDURE — 3077F SYST BP >= 140 MM HG: CPT | Performed by: STUDENT IN AN ORGANIZED HEALTH CARE EDUCATION/TRAINING PROGRAM

## 2025-01-27 PROCEDURE — 69209 REMOVE IMPACTED EAR WAX UNI: CPT | Performed by: STUDENT IN AN ORGANIZED HEALTH CARE EDUCATION/TRAINING PROGRAM

## 2025-01-27 PROCEDURE — 1123F ACP DISCUSS/DSCN MKR DOCD: CPT | Performed by: STUDENT IN AN ORGANIZED HEALTH CARE EDUCATION/TRAINING PROGRAM

## 2025-01-27 PROCEDURE — 1159F MED LIST DOCD IN RCRD: CPT | Performed by: STUDENT IN AN ORGANIZED HEALTH CARE EDUCATION/TRAINING PROGRAM

## 2025-01-27 PROCEDURE — 71046 X-RAY EXAM CHEST 2 VIEWS: CPT | Performed by: RADIOLOGY

## 2025-01-27 PROCEDURE — 99212 OFFICE O/P EST SF 10 MIN: CPT | Performed by: STUDENT IN AN ORGANIZED HEALTH CARE EDUCATION/TRAINING PROGRAM

## 2025-01-27 PROCEDURE — 3079F DIAST BP 80-89 MM HG: CPT | Performed by: STUDENT IN AN ORGANIZED HEALTH CARE EDUCATION/TRAINING PROGRAM

## 2025-01-27 RX ORDER — PROMETHAZINE HYDROCHLORIDE AND DEXTROMETHORPHAN HYDROBROMIDE 6.25; 15 MG/5ML; MG/5ML
5 SYRUP ORAL EVERY 8 HOURS PRN
Qty: 120 ML | Refills: 0 | Status: SHIPPED | OUTPATIENT
Start: 2025-01-27 | End: 2025-02-26

## 2025-01-27 NOTE — PROGRESS NOTES
Subjective   Patient ID: Isa Arciniega is a 70 y.o. female who presents for Cerumen Impaction.    HPI     Review of Systems    Objective   /80   Pulse 78   Temp 36.6 °C (97.9 °F)   SpO2 93%     Physical Exam    Assessment/Plan   Problem List Items Addressed This Visit             ICD-10-CM    Cough R05.9    Relevant Medications    promethazine-DM (Phenergan-DM) 6.25-15 mg/5 mL syrup     Other Visit Diagnoses         Codes    Left ear impacted cerumen    -  Primary H61.22                Patient identity confirmed:  Verbally with patient  Procedure details:     Location:  L ear    Procedure type: irrigation    Post-procedure details:     Inspection:  Ear canal clear    Hearing quality:  Normal    Procedure completion:  Tolerated well, no immediate complications      Assessment/Plan   Problem List Items Addressed This Visit             ICD-10-CM    Cough R05.9    Relevant Medications    promethazine-DM (Phenergan-DM) 6.25-15 mg/5 mL syrup     Other Visit Diagnoses         Codes    Left ear impacted cerumen    -  Primary H61.22          History and physical examination as above.  Patient with earwax impaction on the left side.  She has also still been having a persistent cough.  Ears cleaned out in the office.  Sent in for cough syrup medication to her pharmacy.  She will call with any other acute concerns or complaints.

## 2025-02-05 ASSESSMENT — ENCOUNTER SYMPTOMS
COUGH: 1
FATIGUE: 0
ABDOMINAL PAIN: 0
CHILLS: 0

## 2025-03-25 ENCOUNTER — APPOINTMENT (OUTPATIENT)
Dept: PRIMARY CARE | Facility: CLINIC | Age: 70
End: 2025-03-25
Payer: MEDICARE

## 2025-03-27 ENCOUNTER — OFFICE VISIT (OUTPATIENT)
Dept: PRIMARY CARE | Facility: CLINIC | Age: 70
End: 2025-03-27
Payer: MEDICARE

## 2025-03-27 VITALS — HEART RATE: 73 BPM | TEMPERATURE: 98 F

## 2025-03-27 DIAGNOSIS — I10 BENIGN ESSENTIAL HYPERTENSION: ICD-10-CM

## 2025-03-27 DIAGNOSIS — E78.2 MIXED HYPERLIPIDEMIA: ICD-10-CM

## 2025-03-27 DIAGNOSIS — R05.3 CHRONIC COUGH: ICD-10-CM

## 2025-03-27 DIAGNOSIS — B37.2 CANDIDAL INTERTRIGO: Primary | ICD-10-CM

## 2025-03-27 PROCEDURE — 1159F MED LIST DOCD IN RCRD: CPT | Performed by: STUDENT IN AN ORGANIZED HEALTH CARE EDUCATION/TRAINING PROGRAM

## 2025-03-27 PROCEDURE — G2211 COMPLEX E/M VISIT ADD ON: HCPCS | Performed by: STUDENT IN AN ORGANIZED HEALTH CARE EDUCATION/TRAINING PROGRAM

## 2025-03-27 PROCEDURE — 1160F RVW MEDS BY RX/DR IN RCRD: CPT | Performed by: STUDENT IN AN ORGANIZED HEALTH CARE EDUCATION/TRAINING PROGRAM

## 2025-03-27 PROCEDURE — 1123F ACP DISCUSS/DSCN MKR DOCD: CPT | Performed by: STUDENT IN AN ORGANIZED HEALTH CARE EDUCATION/TRAINING PROGRAM

## 2025-03-27 PROCEDURE — 99213 OFFICE O/P EST LOW 20 MIN: CPT | Performed by: STUDENT IN AN ORGANIZED HEALTH CARE EDUCATION/TRAINING PROGRAM

## 2025-03-27 RX ORDER — PROMETHAZINE HYDROCHLORIDE AND DEXTROMETHORPHAN HYDROBROMIDE 6.25; 15 MG/5ML; MG/5ML
5 SYRUP ORAL 2 TIMES DAILY PRN
Qty: 120 ML | Refills: 0 | Status: SHIPPED | OUTPATIENT
Start: 2025-03-27

## 2025-03-27 RX ORDER — NYSTATIN 100000 [USP'U]/G
1 POWDER TOPICAL 2 TIMES DAILY
Qty: 60 G | Refills: 1 | Status: SHIPPED | OUTPATIENT
Start: 2025-03-27

## 2025-03-27 RX ORDER — TRIAMTERENE/HYDROCHLOROTHIAZID 37.5-25 MG
1 TABLET ORAL DAILY
Qty: 90 TABLET | Refills: 1 | Status: SHIPPED | OUTPATIENT
Start: 2025-03-27

## 2025-03-27 RX ORDER — LOSARTAN POTASSIUM 50 MG/1
50 TABLET ORAL DAILY
Qty: 90 TABLET | Refills: 1 | Status: SHIPPED | OUTPATIENT
Start: 2025-03-27

## 2025-03-27 RX ORDER — ROSUVASTATIN CALCIUM 5 MG/1
5 TABLET, COATED ORAL NIGHTLY
Qty: 90 TABLET | Refills: 1 | Status: SHIPPED | OUTPATIENT
Start: 2025-03-27

## 2025-03-27 RX ORDER — KETOCONAZOLE 20 MG/G
CREAM TOPICAL 2 TIMES DAILY
Qty: 60 G | Refills: 1 | Status: SHIPPED | OUTPATIENT
Start: 2025-03-27

## 2025-03-27 ASSESSMENT — ENCOUNTER SYMPTOMS
SHORTNESS OF BREATH: 0
CHILLS: 0
HEADACHES: 0
MYALGIAS: 0
DIZZINESS: 0
RHINORRHEA: 0
FATIGUE: 0
COUGH: 0
LIGHT-HEADEDNESS: 0
ARTHRALGIAS: 0
ABDOMINAL PAIN: 0
FEVER: 0

## 2025-03-27 NOTE — PROGRESS NOTES
Subjective   Patient ID: Isa Arciniega is a 70 y.o. female who presents for Follow-up.    HPI     Review of Systems   Constitutional:  Negative for chills, fatigue and fever.   HENT:  Negative for congestion and rhinorrhea.    Respiratory:  Negative for cough and shortness of breath.    Cardiovascular:  Negative for chest pain.   Gastrointestinal:  Negative for abdominal pain.   Musculoskeletal:  Negative for arthralgias and myalgias.   Skin:  Positive for rash.   Neurological:  Negative for dizziness, light-headedness and headaches.       Objective   Pulse 73   Temp 36.7 °C (98 °F)     Physical Exam  Vitals and nursing note reviewed.   Constitutional:       General: She is not in acute distress.     Appearance: Normal appearance. She is not ill-appearing or toxic-appearing.      Comments: In wheelchair   HENT:      Head: Normocephalic and atraumatic.   Cardiovascular:      Rate and Rhythm: Normal rate and regular rhythm.      Heart sounds: Normal heart sounds.   Pulmonary:      Effort: Pulmonary effort is normal.      Breath sounds: Normal breath sounds.   Skin:     General: Skin is warm and dry.      Findings: Rash present. No erythema or lesion.   Neurological:      Mental Status: She is alert.         Assessment/Plan   Problem List Items Addressed This Visit             ICD-10-CM    Benign essential hypertension I10    Relevant Medications    losartan (Cozaar) 50 mg tablet    triamterene-hydrochlorothiazid (Maxzide-25) 37.5-25 mg tablet    Cough R05.9    Relevant Medications    promethazine-DM (Phenergan-DM) 6.25-15 mg/5 mL syrup    Hyperlipidemia E78.5    Relevant Medications    rosuvastatin (Crestor) 5 mg tablet     Other Visit Diagnoses         Codes    Candidal intertrigo    -  Primary B37.2    Relevant Medications    nystatin (Mycostatin) 100,000 unit/gram powder    ketoconazole (NIZOral) 2 % cream                essential hypertension I10    Relevant Medications    losartan (Cozaar) 50 mg tablet    triamterene-hydrochlorothiazid (Maxzide-25) 37.5-25 mg tablet    Cough R05.9    Relevant Medications    promethazine-DM (Phenergan-DM) 6.25-15 mg/5 mL syrup    Hyperlipidemia E78.5    Relevant Medications    rosuvastatin (Crestor) 5 mg tablet     Other Visit Diagnoses         Codes    Candidal intertrigo    -  Primary B37.2    Relevant Medications    nystatin (Mycostatin) 100,000 unit/gram powder    ketoconazole (NIZOral) 2 % cream          History and physical examination as above.  Patient coming in for overall follow-up.  Refilled patient's blood pressure medications including her losartan and her combination triamterene-hydrochlorothiazide.  Also sent in prescription for refill for her rosuvastatin for her high cholesterol.  Patient with continuing, annoying cough.  She denies any other concerns at this time.  Refilled patient's cough medication as this does seem to help.  Did discuss other signs and symptoms to watch for that would indicate worsening infection.  Patient also coming in with likely candidal intertrigo.  Given prescription for nystatin powder as well as a ketoconazole cream to apply over the area.  She will call if any symptoms change or worsen.  We will continue with regular follow-up.  She will come in sooner with other acute concerns or complaints.

## 2025-04-03 ENCOUNTER — TELEPHONE (OUTPATIENT)
Dept: PRIMARY CARE | Facility: CLINIC | Age: 70
End: 2025-04-03
Payer: COMMERCIAL

## 2025-04-03 NOTE — TELEPHONE ENCOUNTER
Patient called with her daughter today stating that she had an episode on Sunday, ( details about this event was not given to me over the phone.) patient is requesting an referral to see psychiatry to be faxed to [phone]. I did ask for a Dx for this but it was not given to me. Please call patient at [phone] if there are any questions. Please advise

## 2025-04-07 DIAGNOSIS — T14.90XA TRAUMA: ICD-10-CM

## 2025-04-07 ASSESSMENT — ENCOUNTER SYMPTOMS: COUGH: 1

## 2025-07-16 ENCOUNTER — TELEPHONE (OUTPATIENT)
Dept: PRIMARY CARE | Facility: CLINIC | Age: 70
End: 2025-07-16
Payer: COMMERCIAL

## 2025-07-16 NOTE — TELEPHONE ENCOUNTER
Pt is requesting an appt with you for leg and back pain she is currently in [place] and will be back 7/23/25/ and will be leaving on 7/29/25 to go back. She would like to come in on 7/28/25 but all you have are same day sick appts? Please advise. Pt has to arrange transportation with [employer].

## 2025-07-28 ENCOUNTER — TELEPHONE (OUTPATIENT)
Dept: PRIMARY CARE | Facility: CLINIC | Age: 70
End: 2025-07-28

## 2025-07-28 ENCOUNTER — APPOINTMENT (OUTPATIENT)
Dept: PRIMARY CARE | Facility: CLINIC | Age: 70
End: 2025-07-28
Payer: MEDICARE

## 2025-07-28 VITALS — BODY MASS INDEX: 48.7 KG/M2 | WEIGHT: 153 LBS

## 2025-07-28 DIAGNOSIS — E34.30 SHORT STATURE DUE TO ENDOCRINE DISORDER: Primary | ICD-10-CM

## 2025-07-28 DIAGNOSIS — M51.360 DEGENERATION OF INTERVERTEBRAL DISC OF LUMBAR REGION WITH DISCOGENIC BACK PAIN: ICD-10-CM

## 2025-07-28 DIAGNOSIS — Z99.3 USES POWERED WHEELCHAIR: ICD-10-CM

## 2025-07-28 DIAGNOSIS — H61.21 RIGHT EAR IMPACTED CERUMEN: ICD-10-CM

## 2025-07-28 DIAGNOSIS — M48.061 SPINAL STENOSIS OF LUMBAR REGION, UNSPECIFIED WHETHER NEUROGENIC CLAUDICATION PRESENT: ICD-10-CM

## 2025-07-28 DIAGNOSIS — E66.813 CLASS 3 SEVERE OBESITY WITH BODY MASS INDEX (BMI) OF 45.0 TO 49.9 IN ADULT, UNSPECIFIED OBESITY TYPE, UNSPECIFIED WHETHER SERIOUS COMORBIDITY PRESENT: ICD-10-CM

## 2025-07-28 PROBLEM — R05.9 COUGH: Status: RESOLVED | Noted: 2023-02-09 | Resolved: 2025-07-28

## 2025-07-28 PROBLEM — E66.9 OBESITY, UNSPECIFIED: Status: ACTIVE | Noted: 2025-07-28

## 2025-07-28 PROBLEM — E66.01 OBESITY, MORBID (MULTI): Status: RESOLVED | Noted: 2023-06-06 | Resolved: 2025-07-28

## 2025-07-28 PROCEDURE — 1160F RVW MEDS BY RX/DR IN RCRD: CPT | Performed by: STUDENT IN AN ORGANIZED HEALTH CARE EDUCATION/TRAINING PROGRAM

## 2025-07-28 PROCEDURE — 99214 OFFICE O/P EST MOD 30 MIN: CPT | Performed by: STUDENT IN AN ORGANIZED HEALTH CARE EDUCATION/TRAINING PROGRAM

## 2025-07-28 PROCEDURE — 1159F MED LIST DOCD IN RCRD: CPT | Performed by: STUDENT IN AN ORGANIZED HEALTH CARE EDUCATION/TRAINING PROGRAM

## 2025-07-28 PROCEDURE — 69209 REMOVE IMPACTED EAR WAX UNI: CPT | Performed by: STUDENT IN AN ORGANIZED HEALTH CARE EDUCATION/TRAINING PROGRAM

## 2025-07-28 PROCEDURE — 1036F TOBACCO NON-USER: CPT | Performed by: STUDENT IN AN ORGANIZED HEALTH CARE EDUCATION/TRAINING PROGRAM

## 2025-07-28 RX ORDER — ALPRAZOLAM 0.5 MG/1
1 TABLET ORAL NIGHTLY PRN
COMMUNITY
Start: 2025-04-25

## 2025-07-28 RX ORDER — BUPROPION HYDROCHLORIDE 150 MG/1
150 TABLET ORAL
COMMUNITY
Start: 2025-07-17

## 2025-07-28 ASSESSMENT — ENCOUNTER SYMPTOMS
COUGH: 0
NAUSEA: 0
BACK PAIN: 1
DIZZINESS: 0
CHILLS: 0
LIGHT-HEADEDNESS: 0
ABDOMINAL PAIN: 0
SINUS PRESSURE: 0
LEG PAIN: 1
DIARRHEA: 0
SINUS PAIN: 0
SHORTNESS OF BREATH: 0
HEADACHES: 0
FEVER: 0
CONSTIPATION: 0
VOMITING: 0
FATIGUE: 0

## 2025-07-28 NOTE — PROGRESS NOTES
Subjective   Patient ID: Isa Arciniega is a 70 y.o. female who presents for Back Pain and Leg Pain.    Back Pain  Associated symptoms include leg pain. Pertinent negatives include no abdominal pain, chest pain, fever or headaches.   Leg Pain          Patient is coming in with a couple different concerns today.  She has been having increasing back pain.  Overall this feels like her chronic pains but she is wondering if there is anything else that she can do for it.  She states that stretching out seems to make it feel better and she has been doing some over-the-counter treatments as well.    She also has some paperwork that needs to be filled out for [employer] so she can use the public transportation.  She has had lumbar spine stenosis and pain since being diagnosed in 1998.  She also was born with short stature which does limit her mobility especially her ability to walk farther distances.    She would like her ears looked at and cleaned out if need be.  She feels like they are plugged up.      Review of Systems   Constitutional:  Negative for chills, fatigue and fever.   HENT:  Positive for ear discharge. Negative for congestion, sinus pressure and sinus pain.    Respiratory:  Negative for cough and shortness of breath.    Cardiovascular:  Negative for chest pain.   Gastrointestinal:  Negative for abdominal pain, constipation, diarrhea, nausea and vomiting.   Musculoskeletal:  Positive for back pain.   Neurological:  Negative for dizziness, light-headedness and headaches.       Objective   Wt 69.4 kg (153 lb)   BMI 48.70 kg/m²     Physical Exam  Vitals and nursing note reviewed.   Constitutional:       General: She is not in acute distress.     Appearance: Normal appearance. She is obese. She is not ill-appearing or toxic-appearing.      Comments: In wheelchair   HENT:      Head: Normocephalic and atraumatic.      Right Ear: Tympanic membrane, ear canal and external ear normal. There is impacted cerumen.      Left  Ear: Tympanic membrane, ear canal and external ear normal. There is no impacted cerumen.      Nose: Nose normal.      Mouth/Throat:      Mouth: Mucous membranes are moist.     Cardiovascular:      Rate and Rhythm: Normal rate and regular rhythm.      Heart sounds: Normal heart sounds.   Pulmonary:      Effort: Pulmonary effort is normal.      Breath sounds: Normal breath sounds.   Abdominal:      Palpations: Abdomen is soft.     Musculoskeletal:         General: Tenderness present. No swelling, deformity or signs of injury. Normal range of motion.     Skin:     General: Skin is warm and dry.     Neurological:      Mental Status: She is alert.       Patient ID: Isa Arciniega is a 70 y.o. female.    Ear Cerumen Removal    Date/Time: 7/28/2025 11:17 PM    Performed by: Riccardo Vela DO  Authorized by: Riccardo Vela DO    Consent:     Consent obtained:  Verbal    Consent given by:  Patient    Risks, benefits, and alternatives were discussed: yes      Risks discussed:  Dizziness and incomplete removal    Alternatives discussed:  No treatment and delayed treatment  Universal protocol:     Patient identity confirmed:  Verbally with patient  Procedure details:     Location:  R ear    Procedure type: irrigation      Procedure outcomes: cerumen removed    Post-procedure details:     Inspection:  Ear canal clear    Hearing quality:  Improved    Procedure completion:  Tolerated well, no immediate complications      Assessment/Plan   Problem List Items Addressed This Visit           ICD-10-CM    Lumbar spinal stenosis M48.061    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  Patient coming in with a couple different concerns today.  She states she is having little bit  more of a flareup of her typical back pain.  It is not really radiating down her leg and she states that stretching does seem to help.  Discussed exercises and other over-the-counter therapies that could help.  Discussed that she can call if anything gets more severe so that we can send in additional treatment but she deferred at this time.  She does need paperwork filled out for the [employer] for her public transportation and this was completed in the office for her.  She will call if they need any additional paperwork.  She does have impacted cerumen of the right ear and this was cleared out in the office and rechecked.  She states that she is going to be going out of town for a while and states she will not be able to come back in the fall or winter.  We will get her set up for Medicare wellness examination in the spring.  She will call sooner with any other acute concerns or complaints.

## 2025-07-28 NOTE — TELEPHONE ENCOUNTER
"Pt came up to check out and reschedule her medicare annual PE that was scheduled on 9/25/25  Pt stated you wanted her to reschedule it for the end of April next year and I am unable to do that in the system its stated  \" 1 Year Follow Up In [facility] - PCP - Wellness Exam expires on 2/24/26.   You must schedule orders on or before their expiration dates.\"  When I went to reschedule the appt. So I am unsure what to do. Please advise     Pt  Phone: [phone]   "

## 2025-07-28 NOTE — PATIENT INSTRUCTIONS
For your back pain.  Please continue with regular back strengthening exercises and you can book these up online especially with pictures on how to perform them.  I would definitely recommend doing back bridges as well as bird-dog's and side planks and you can start on your knees first and then get up to going on your feet.    Please call with any other changes with your paperwork.

## 2025-08-19 ENCOUNTER — PATIENT OUTREACH (OUTPATIENT)
Dept: CARE COORDINATION | Facility: CLINIC | Age: 70
End: 2025-08-19
Payer: COMMERCIAL

## 2025-08-19 DIAGNOSIS — Z12.31 ENCOUNTER FOR SCREENING MAMMOGRAM FOR BREAST CANCER: ICD-10-CM

## 2025-08-21 ENCOUNTER — TELEPHONE (OUTPATIENT)
Dept: PRIMARY CARE | Facility: CLINIC | Age: 70
End: 2025-08-21
Payer: COMMERCIAL

## 2025-09-02 ENCOUNTER — TELEPHONE (OUTPATIENT)
Dept: PRIMARY CARE | Facility: CLINIC | Age: 70
End: 2025-09-02
Payer: COMMERCIAL

## 2025-09-25 ENCOUNTER — APPOINTMENT (OUTPATIENT)
Dept: PRIMARY CARE | Facility: CLINIC | Age: 70
End: 2025-09-25
Payer: COMMERCIAL

## 2025-12-16 ENCOUNTER — APPOINTMENT (OUTPATIENT)
Dept: PRIMARY CARE | Facility: CLINIC | Age: 70
End: 2025-12-16
Payer: COMMERCIAL

## 2026-04-30 ENCOUNTER — APPOINTMENT (OUTPATIENT)
Dept: PRIMARY CARE | Facility: CLINIC | Age: 71
End: 2026-04-30
Payer: COMMERCIAL